# Patient Record
Sex: FEMALE | Race: WHITE | NOT HISPANIC OR LATINO | URBAN - METROPOLITAN AREA
[De-identification: names, ages, dates, MRNs, and addresses within clinical notes are randomized per-mention and may not be internally consistent; named-entity substitution may affect disease eponyms.]

---

## 2020-07-25 ENCOUNTER — TELEPHONE ENCOUNTER (OUTPATIENT)
Dept: URBAN - METROPOLITAN AREA CLINIC 13 | Facility: CLINIC | Age: 70
End: 2020-07-25

## 2020-07-25 RX ORDER — ETHAMBUTOL HYDROCHLORIDE 400 MG/1
TABLET, FILM COATED ORAL
Refills: 0 | OUTPATIENT
End: 2018-08-13

## 2020-07-25 RX ORDER — DICYCLOMINE HYDROCHLORIDE 10 MG/1
TAKE 2 CAPSULE EVERY 6 HOURS PRN ABDOMINAL PAIN CAPSULE ORAL
Qty: 45 | Refills: 11 | OUTPATIENT
Start: 2018-08-13 | End: 2018-10-02

## 2020-07-25 RX ORDER — POLYETHYLENE GLYCOL 3350, SODIUM CHLORIDE, SODIUM BICARBONATE AND POTASSIUM CHLORIDE WITH LEMON FLAVOR 420; 11.2; 5.72; 1.48 G/4L; G/4L; G/4L; G/4L
TAKE AS DIRECTED POWDER, FOR SOLUTION ORAL
Qty: 1 | Refills: 0 | OUTPATIENT
Start: 2018-08-13 | End: 2018-08-13

## 2020-07-26 ENCOUNTER — TELEPHONE ENCOUNTER (OUTPATIENT)
Dept: URBAN - METROPOLITAN AREA CLINIC 13 | Facility: CLINIC | Age: 70
End: 2020-07-26

## 2020-07-26 RX ORDER — ALBUTEROL SULFATE 90 UG/1
INHALE 1 TO 2 PUFFS EVERY 4 TO 6 HOURS AS NEEDED AEROSOL, METERED RESPIRATORY (INHALATION)
Refills: 5 | Status: ACTIVE | COMMUNITY

## 2020-07-26 RX ORDER — ALENDRONATE SODIUM 70 MG/1
TAKE 1 TABLET ONCE WEEKLY TABLET ORAL
Refills: 0 | Status: ACTIVE | COMMUNITY

## 2024-01-03 ENCOUNTER — WEB ENCOUNTER (OUTPATIENT)
Dept: URBAN - METROPOLITAN AREA SURGERY CENTER 25 | Facility: SURGERY CENTER | Age: 74
End: 2024-01-03

## 2024-01-09 ENCOUNTER — WEB ENCOUNTER (OUTPATIENT)
Dept: URBAN - METROPOLITAN AREA SURGERY CENTER 25 | Facility: SURGERY CENTER | Age: 74
End: 2024-01-09

## 2024-01-19 ENCOUNTER — WEB ENCOUNTER (OUTPATIENT)
Dept: URBAN - METROPOLITAN AREA SURGERY CENTER 25 | Facility: SURGERY CENTER | Age: 74
End: 2024-01-19

## 2024-01-22 ENCOUNTER — DASHBOARD ENCOUNTERS (OUTPATIENT)
Age: 74
End: 2024-01-22

## 2024-01-22 ENCOUNTER — OFFICE VISIT (OUTPATIENT)
Dept: URBAN - METROPOLITAN AREA SURGERY CENTER 25 | Facility: SURGERY CENTER | Age: 74
End: 2024-01-22
Payer: COMMERCIAL

## 2024-01-22 ENCOUNTER — CLAIMS CREATED FROM THE CLAIM WINDOW (OUTPATIENT)
Dept: URBAN - METROPOLITAN AREA SURGERY CENTER 25 | Facility: SURGERY CENTER | Age: 74
End: 2024-01-22

## 2024-01-22 DIAGNOSIS — K57.30 COLON, DIVERTICULOSIS: ICD-10-CM

## 2024-01-22 DIAGNOSIS — Z09 ENCOUNTER FOR FOLLOW-UP EXAMINATION AFTER COMPLETED TREATMENT FOR CONDITIONS OTHER THAN MALIGNANT NEOPLASM: ICD-10-CM

## 2024-01-22 DIAGNOSIS — Z86.010 PERSONAL HISTORY OF COLON POLYPS: ICD-10-CM

## 2024-01-22 DIAGNOSIS — Z09 ENCNTR FOR F/U EXAM AFT TRTMT FOR COND OTH THAN MALIG NEOPLM: ICD-10-CM

## 2024-01-22 DIAGNOSIS — K64.4 RESIDUAL HEMORRHOIDAL SKIN TAGS: ICD-10-CM

## 2024-01-22 DIAGNOSIS — Z86.010 ADENOMAS PERSONAL HISTORY OF COLONIC POLYPS: ICD-10-CM

## 2024-01-22 PROCEDURE — 00811 ANES LWR INTST NDSC NOS: CPT | Performed by: ANESTHESIOLOGIST ASSISTANT

## 2024-01-22 PROCEDURE — G8907 PT DOC NO EVENTS ON DISCHARG: HCPCS | Performed by: INTERNAL MEDICINE

## 2024-01-22 PROCEDURE — G0105 COLORECTAL SCRN; HI RISK IND: HCPCS | Performed by: INTERNAL MEDICINE

## 2024-01-22 PROCEDURE — 00811 ANES LWR INTST NDSC NOS: CPT | Performed by: ANESTHESIOLOGY

## 2024-01-22 RX ORDER — ALBUTEROL SULFATE 90 UG/1
INHALE 1 TO 2 PUFFS EVERY 4 TO 6 HOURS AS NEEDED AEROSOL, METERED RESPIRATORY (INHALATION)
Refills: 5 | Status: ACTIVE | COMMUNITY

## 2024-01-22 RX ORDER — ALENDRONATE SODIUM 70 MG/1
TAKE 1 TABLET ONCE WEEKLY TABLET ORAL
Refills: 0 | Status: ACTIVE | COMMUNITY

## 2025-06-09 ENCOUNTER — LAB OUTSIDE AN ENCOUNTER (OUTPATIENT)
Dept: URBAN - METROPOLITAN AREA CLINIC 113 | Facility: CLINIC | Age: 75
End: 2025-06-09

## 2025-06-09 ENCOUNTER — OFFICE VISIT (OUTPATIENT)
Dept: URBAN - METROPOLITAN AREA CLINIC 113 | Facility: CLINIC | Age: 75
End: 2025-06-09
Payer: MEDICARE

## 2025-06-09 DIAGNOSIS — K21.9 GERD: ICD-10-CM

## 2025-06-09 PROCEDURE — 99214 OFFICE O/P EST MOD 30 MIN: CPT | Performed by: NURSE PRACTITIONER

## 2025-06-09 RX ORDER — ALENDRONATE SODIUM 70 MG/1
TAKE 1 TABLET ONCE WEEKLY TABLET ORAL
Refills: 0 | Status: ON HOLD | COMMUNITY

## 2025-06-09 RX ORDER — ALBUTEROL SULFATE 90 UG/1
INHALE 1 TO 2 PUFFS EVERY 4 TO 6 HOURS AS NEEDED AEROSOL, METERED RESPIRATORY (INHALATION)
Refills: 5 | Status: ACTIVE | COMMUNITY

## 2025-06-09 RX ORDER — FLUTICASONE FUROATE, UMECLIDINIUM BROMIDE AND VILANTEROL TRIFENATATE 200; 62.5; 25 UG/1; UG/1; UG/1
1 PUFF POWDER RESPIRATORY (INHALATION) ONCE A DAY
Status: ACTIVE | COMMUNITY

## 2025-06-09 NOTE — HPI-TODAY'S VISIT:
74-year-old female with history of MAC, GERD, colon polyps, presenting for evaluation of abdominal pain. She was previously followed in the office in 2018 for abdominal pain and constipation felt to be functional in nature.  Her symptoms ultimately responded to a daily bowel regimen with MiraLAX. She underwent a colonoscopy for polyp surveillance in January 2024 notable for minimal sigmoid diverticulosis and small external hemorrhoids.  Repeat colonoscopy was recommended in 7 years. Per a review of recent records, she had a CT of the abdomen with contrast on 5/14/2025 which showed no acute process. Labs with her PCP on 5/9/2025 show an unremarkable CMP and normal lipase. Within the last several months, she has experienced an exacerbation of epigastric pain, which she describes as tight or heavy.  This is not appear to be related to eating or bowel movements.  The discomfort seems to be worse with walking, but is unaffected by other positional changes.  She denies associated nausea or vomiting.  She has frequent reflux symptoms, which may awaken her at night several times per week.  This typically subsides with drinking water but she does require Tums on occasion.  She is not on acid suppressive therapy.  Her bowel habits are regular.  She denies constipation, diarrhea, or red blood per rectum.  She takes NSAIDs on occasion.

## 2025-06-20 ENCOUNTER — CLAIMS CREATED FROM THE CLAIM WINDOW (OUTPATIENT)
Dept: URBAN - METROPOLITAN AREA CLINIC 4 | Facility: CLINIC | Age: 75
End: 2025-06-20
Payer: MEDICARE

## 2025-06-20 ENCOUNTER — TELEPHONE ENCOUNTER (OUTPATIENT)
Dept: URBAN - METROPOLITAN AREA CLINIC 113 | Facility: CLINIC | Age: 75
End: 2025-06-20

## 2025-06-20 ENCOUNTER — CLAIMS CREATED FROM THE CLAIM WINDOW (OUTPATIENT)
Dept: URBAN - METROPOLITAN AREA SURGERY CENTER 25 | Facility: SURGERY CENTER | Age: 75
End: 2025-06-20
Payer: MEDICARE

## 2025-06-20 DIAGNOSIS — K21.9 GASTRO-ESOPHAGEAL REFLUX DISEASE WITHOUT ESOPHAGITIS: ICD-10-CM

## 2025-06-20 DIAGNOSIS — K31.7 POLYP OF STOMACH AND DUODENUM: ICD-10-CM

## 2025-06-20 DIAGNOSIS — K31.7 BENIGN GASTRIC POLYP: ICD-10-CM

## 2025-06-20 DIAGNOSIS — K31.89 OTHER DISEASES OF STOMACH AND DUODENUM: ICD-10-CM

## 2025-06-20 PROCEDURE — 43239 EGD BIOPSY SINGLE/MULTIPLE: CPT | Performed by: INTERNAL MEDICINE

## 2025-06-20 PROCEDURE — 00731 ANES UPR GI NDSC PX NOS: CPT | Performed by: NURSE ANESTHETIST, CERTIFIED REGISTERED

## 2025-06-20 PROCEDURE — 43239 EGD BIOPSY SINGLE/MULTIPLE: CPT | Performed by: CLINIC/CENTER

## 2025-06-20 PROCEDURE — 88305 TISSUE EXAM BY PATHOLOGIST: CPT | Performed by: PATHOLOGY

## 2025-06-20 RX ORDER — PANTOPRAZOLE SODIUM 40 MG/1
1 TABLET TABLET, DELAYED RELEASE ORAL ONCE A DAY
Qty: 90 | Refills: 3 | OUTPATIENT
Start: 2025-06-20

## 2025-06-20 RX ORDER — ALBUTEROL SULFATE 90 UG/1
INHALE 1 TO 2 PUFFS EVERY 4 TO 6 HOURS AS NEEDED AEROSOL, METERED RESPIRATORY (INHALATION)
Refills: 5 | Status: ACTIVE | COMMUNITY

## 2025-06-20 RX ORDER — ALENDRONATE SODIUM 70 MG/1
TAKE 1 TABLET ONCE WEEKLY TABLET ORAL
Refills: 0 | Status: ON HOLD | COMMUNITY

## 2025-06-20 RX ORDER — FLUTICASONE FUROATE, UMECLIDINIUM BROMIDE AND VILANTEROL TRIFENATATE 200; 62.5; 25 UG/1; UG/1; UG/1
1 PUFF POWDER RESPIRATORY (INHALATION) ONCE A DAY
Status: ACTIVE | COMMUNITY

## 2025-06-30 ENCOUNTER — OFFICE VISIT (OUTPATIENT)
Dept: URBAN - METROPOLITAN AREA SURGERY CENTER 25 | Facility: SURGERY CENTER | Age: 75
End: 2025-06-30

## 2025-07-11 ENCOUNTER — OFFICE VISIT (OUTPATIENT)
Dept: URBAN - METROPOLITAN AREA CLINIC 113 | Facility: CLINIC | Age: 75
End: 2025-07-11
Payer: MEDICARE

## 2025-07-11 DIAGNOSIS — K21.9 GERD: ICD-10-CM

## 2025-07-11 PROCEDURE — 99213 OFFICE O/P EST LOW 20 MIN: CPT | Performed by: NURSE PRACTITIONER

## 2025-07-11 RX ORDER — ALBUTEROL SULFATE 90 UG/1
INHALE 1 TO 2 PUFFS EVERY 4 TO 6 HOURS AS NEEDED AEROSOL, METERED RESPIRATORY (INHALATION)
Refills: 5 | Status: ACTIVE | COMMUNITY

## 2025-07-11 RX ORDER — FLUTICASONE FUROATE, UMECLIDINIUM BROMIDE AND VILANTEROL TRIFENATATE 200; 62.5; 25 UG/1; UG/1; UG/1
1 PUFF POWDER RESPIRATORY (INHALATION) ONCE A DAY
Status: ACTIVE | COMMUNITY

## 2025-07-11 RX ORDER — ALENDRONATE SODIUM 70 MG/1
TAKE 1 TABLET ONCE WEEKLY TABLET ORAL
Refills: 0 | Status: ON HOLD | COMMUNITY

## 2025-07-11 RX ORDER — PANTOPRAZOLE SODIUM 40 MG/1
1 TABLET TABLET, DELAYED RELEASE ORAL ONCE A DAY
Qty: 90 | Refills: 3 | Status: ACTIVE | COMMUNITY
Start: 2025-06-20

## 2025-07-11 NOTE — HPI-OTHER HISTORIES
CT of the abdomen with contrast on 5/14/2025 which showed no acute process. Labs with her PCP on 5/9/2025 show an unremarkable CMP and normal lipase. Colonoscopy for polyp surveillance in January 2024 notable for minimal sigmoid diverticulosis and small external hemorrhoids. Repeat colonoscopy was recommended in 7 years.

## 2025-07-11 NOTE — HPI-TODAY'S VISIT:
74-year-old female with history of MAC, GERD, colon polyps, presenting for evaluation of abdominal pain. She was seen in the office in June for evaluation of epigastric pain and guarding.  Recent CT showed no acute process.  An upper endoscopy was planned.  A trial of PPI was a consideration. EGD 6/20/2025:LA grade A reflux esophagitis limited to the GE junction, granular mucosa at the GEJ, multiple benign-appearing 3 to 4 mm gastric body polyps, mild nonerosive antral gastritis.  Biopsies of the GE junction showed reflux type changes.  Gastric biopsies were negative for H. pylori.  She was started on pantoprazole 40 mg daily. Her reflux symptoms have improved with initiation of pantoprazole.  She continues to experience occasional episodes of upper abdominal discomfort, which seems to be worsened with activity, certain positional changes, or carrying something heavy.  The discomfort is typically self-limited, resolving within a few seconds.  She feels this may be musculoskeletal in origin.  She states it does not even require use of any anti-inflammatory or heating pad.  She does not experience abdominal pain associated with meals.  No nausea or vomiting.  Her bowel habits are regular.